# Patient Record
Sex: MALE | Race: WHITE
[De-identification: names, ages, dates, MRNs, and addresses within clinical notes are randomized per-mention and may not be internally consistent; named-entity substitution may affect disease eponyms.]

---

## 2021-08-27 NOTE — NUR
wheeled patient via gurney accompanied by EMT in no distress. RN asssigned at 
bedside to assume care.

## 2021-08-27 NOTE — NUR
MS RN CLOSING NOTES



PT IS IN BED, RESTING COMFORTABLY. PT IS AO x4. NO S/SX OF RESPIRATORY DISTRESS NOTED. NO 
SOB NOTED. PT IS ON ROOM AIR AND TOLERATING WELL. IV ACCESS IN LEFT AC #20 WITH D5NS RUNNING 
 ML/HR. NG TUBE IN PLACE IN LEFT NARE ON LOW INTERMITTENT SUCTION DRAINING BROWN AND 
BLOODY DRAINAGE. PATIENT'S PAIN TREATED. ALL NEEDS MET. PT KEPT CLEAN AND DRY. SAFETY 
MEASURES IN PLACE: BED IN LOWEST, LOCKED POSITION, BRAKES ON, SIDERAILS UPx2. WILL ENDORSE 
TO ONCOMING SHIFT.

## 2021-08-27 NOTE — NUR
PT BIBS WITH GENERALIZED STOMACH PAIN. NO N/V/D. PT ALERT AND ORIENTED X4. 
AMBULATORY WITH NON LABORED BREATHING.

## 2021-08-27 NOTE — NUR
RN NOTES



PATIENT ARRIVED TO UNIT AT ROOM 320-2 VIA GURNEY, ACCOMPANIED BY 1 ER TECH. PATIENT ABLE TO 
AMBULATE TO BED W/ STEADY GAIT.

## 2021-08-28 NOTE — NUR
MS/RN OPENING NOTE



RECEIVED PATIENT SLEEPING IN BED. ALERT AND ORIENTED X 4. ABLE TO MAKE NEEDS KNOWN. DENIES 
PAIN AT THIS TIME. CONTINUES ON ROOM AIR WITH NO S/SX OF RESPIRATORY DISTRESS NOTED. IV 
ACCESS TO RIGHT AC #20G INTACT AND PATENT. CONTINUES ON IVF D5 NS @ 100ML/HR. CONTINUES ON 
CLEAR LIQUID DIET WITH NO S/SX OF NAUSEA OR VOMITING. CALL LIGHT WITHIN REACH. ASPIRATION, 
FALL AND SAFETY PRECAUTIONS MAINTAINED. WILL CONTINUE TO MONITOR.

## 2021-08-28 NOTE — NUR
PAIN MANAGEMENT



PATIENT HAS C/O PAIN ON ABDOMINAL AREA WITH PAIN SCALE OF 9/10, DUE PRN PAIN MEDICATION 
GIVEN AS ORDERED.

## 2021-08-28 NOTE — NUR
RN NOTES



PATIENT WAS PICKED BY [facility] FROM RADIOLOGY DEPARTMENT FRO SMALL BOWEL THROUGH PROCEDURE. LEFT 
IN STABLE CONDITION.

## 2021-08-28 NOTE — NUR
MS RN CLOSING NOTES



PT IS IN BED, RESTING COMFORTABLY. PT IS AO x4. NO S/SX OF RESPIRATORY DISTRESS NOTED. NO 
SOB NOTED. PT IS ON ROOM AIR AND TOLERATING WELL. IV ACCESS IN LEFT AC #20 WITH D5NS RUNNING 
 ML/HR. NO C/O PAIN AT THIS TIME. SAFETY MEASURES IN PLACE: BED IN LOWEST, LOCKED 
POSITION, BRAKES ON, SIDE RAILS UPx2. ALL NEEDS ATTENDED AND MET. WILL ENDORSE TO ONCOMING 
SHIFT FOR JYOTI.

## 2021-08-28 NOTE — NUR
ALERT AND ORIENTED X4, STABLE ON ROOM AIR, ABDOMINAL DISCOMFORT, NG TUBE IN PLACE, CONFIRMED 
BY XRAY, ON LOW INTERMITTENT SUCTION, OUTPUT BROWN, MORPHINE 4 MG IV Q4HRS WITH ADEQUATE 
RELIEF. FOR GI CONSULT WITH DR. BUSBY, CONTINUE SUPPORITVE CARE AND PAIN MANAGEMENT.

## 2021-08-28 NOTE — NUR
MS RN OPENING NOTES



RECEIVED PT IS IN BED, RESTING COMFORTABLY. PT IS AO x4. NO S/SX OF RESPIRATORY DISTRESS 
NOTED. NO SOB NOTED. PT IS ON ROOM AIR AND TOLERATING WELL. IV ACCESS IN LEFT AC #20 WITH 
D5NS RUNNING  ML/HR. NG TUBE IN PLACE IN LEFT NARE ON LOW INTERMITTENT SUCTION 
DRAINING BROWN DRAINAGE. NO C/O PAIN AT THIS TIME. SAFETY MEASURES IN PLACE: BED IN LOWEST, 
LOCKED POSITION, BRAKES ON, SIDE RAILS UPx2. WILL CONTINUE TO MONITOR ACCORDINGLY.

## 2021-08-29 NOTE — NUR
MS RN OPENING NOTES



RECEIVED PT IS IN BED, RESTING COMFORTABLY. PT IS AO x4. NO S/SX OF RESPIRATORY DISTRESS 
NOTED. NO SOB NOTED. PT IS ON ROOM AIR AND TOLERATING WELL. IV ACCESS IN LEFT AC #20 WITH 
D5NS RUNNING  ML/HR. ABLE TO TOLERATE CLEAR LIQUIDS DIET, NO VOMITTING NO C/O OF 
NAUSEA AT THIS TIME. NO C/O PAIN AT THIS TIME. SAFETY MEASURES IN PLACE: BED IN LOWEST, 
LOCKED POSITION, BRAKES ON, SIDE RAILS UPx2. WILL CONTINUE TO MONITOR ACCORDINGLY.

## 2021-08-29 NOTE — NUR
MS RN DISCHARGE NOTES



DISCHARGE PATIENT IN STABLE CONDITION. VITAL SIGNS WITHIN NORMAL LIMITS. HEALTH EDUCATION 
AND INSTRUCTIONS PROVIDED TO PATIENT, INSTRUCTED ON FOLLOW UP WITH PCP FOR GI REFERRAL, 
PATIENT VERBALIZED UNDERSTANDING. IV ACCESS REMOVED, COVERED WITH GAUZE AND SECURED WITH 
TAPE. NO BLEEDING NOTED. ARM BAND REMOVED. BELONGINGS SIGNED ADN ACCOUNTED FOR. PATIENT LEFT 
UNIT WITH FAMILY. CHARGE NURSE AND MD INFORMED.

## 2021-10-01 ENCOUNTER — HOSPITAL ENCOUNTER (INPATIENT)
Dept: HOSPITAL 54 - ER | Age: 36
LOS: 1 days | Discharge: HOME | DRG: 390 | End: 2021-10-02
Attending: NURSE PRACTITIONER | Admitting: NURSE PRACTITIONER
Payer: COMMERCIAL

## 2021-10-01 VITALS — BODY MASS INDEX: 21.67 KG/M2 | HEIGHT: 72 IN | WEIGHT: 160 LBS

## 2021-10-01 VITALS — DIASTOLIC BLOOD PRESSURE: 73 MMHG | SYSTOLIC BLOOD PRESSURE: 128 MMHG

## 2021-10-01 DIAGNOSIS — Z20.822: ICD-10-CM

## 2021-10-01 DIAGNOSIS — K52.9: ICD-10-CM

## 2021-10-01 DIAGNOSIS — N40.0: ICD-10-CM

## 2021-10-01 DIAGNOSIS — K56.609: Primary | ICD-10-CM

## 2021-10-01 DIAGNOSIS — F90.9: ICD-10-CM

## 2021-10-01 DIAGNOSIS — D72.829: ICD-10-CM

## 2021-10-01 LAB
ALBUMIN SERPL BCP-MCNC: 3.4 G/DL (ref 3.4–5)
ALP SERPL-CCNC: 65 U/L (ref 46–116)
ALT SERPL W P-5'-P-CCNC: 28 U/L (ref 12–78)
AST SERPL W P-5'-P-CCNC: 14 U/L (ref 15–37)
BASOPHILS # BLD AUTO: 0 K/UL (ref 0–0.2)
BASOPHILS NFR BLD AUTO: 0.2 % (ref 0–2)
BILIRUB DIRECT SERPL-MCNC: 0.2 MG/DL (ref 0–0.2)
BILIRUB SERPL-MCNC: 0.6 MG/DL (ref 0.2–1)
BILIRUB UR QL STRIP: (no result)
BUN SERPL-MCNC: 13 MG/DL (ref 7–18)
CALCIUM SERPL-MCNC: 8.2 MG/DL (ref 8.5–10.1)
CHLORIDE SERPL-SCNC: 104 MMOL/L (ref 98–107)
CO2 SERPL-SCNC: 28 MMOL/L (ref 21–32)
COLOR UR: (no result)
CREAT SERPL-MCNC: 0.9 MG/DL (ref 0.6–1.3)
EOSINOPHIL NFR BLD AUTO: 0.2 % (ref 0–6)
GLUCOSE SERPL-MCNC: 143 MG/DL (ref 74–106)
GLUCOSE UR STRIP-MCNC: NEGATIVE MG/DL
HCT VFR BLD AUTO: 44 % (ref 39–51)
HGB BLD-MCNC: 14.8 G/DL (ref 13.5–17.5)
LEUKOCYTE ESTERASE UR QL STRIP: NEGATIVE
LIPASE SERPL-CCNC: 160 U/L (ref 73–393)
LYMPHOCYTES NFR BLD AUTO: 0.5 K/UL (ref 0.8–4.8)
LYMPHOCYTES NFR BLD AUTO: 4.6 % (ref 20–44)
MCHC RBC AUTO-ENTMCNC: 34 G/DL (ref 31–36)
MCV RBC AUTO: 86 FL (ref 80–96)
MONOCYTES NFR BLD AUTO: 0.7 K/UL (ref 0.1–1.3)
MONOCYTES NFR BLD AUTO: 6.2 % (ref 2–12)
NEUTROPHILS # BLD AUTO: 10.1 K/UL (ref 1.8–8.9)
NEUTROPHILS NFR BLD AUTO: 88.8 % (ref 43–81)
NITRITE UR QL STRIP: NEGATIVE
PH UR STRIP: 6 [PH] (ref 5–8)
PLATELET # BLD AUTO: 369 K/UL (ref 150–450)
POTASSIUM SERPL-SCNC: 3.9 MMOL/L (ref 3.5–5.1)
PROT SERPL-MCNC: 7.1 G/DL (ref 6.4–8.2)
PROT UR QL STRIP: (no result) MG/DL
RBC # BLD AUTO: 5.12 MIL/UL (ref 4.5–6)
RBC #/AREA URNS HPF: (no result) /HPF (ref 0–2)
SODIUM SERPL-SCNC: 139 MMOL/L (ref 136–145)
UROBILINOGEN UR STRIP-MCNC: 0.2 EU/DL
WBC #/AREA URNS HPF: (no result) /HPF (ref 0–3)
WBC NRBC COR # BLD AUTO: 11.4 K/UL (ref 4.3–11)

## 2021-10-01 PROCEDURE — G0378 HOSPITAL OBSERVATION PER HR: HCPCS

## 2021-10-01 PROCEDURE — C9113 INJ PANTOPRAZOLE SODIUM, VIA: HCPCS

## 2021-10-01 PROCEDURE — C9803 HOPD COVID-19 SPEC COLLECT: HCPCS

## 2021-10-01 RX ADMIN — ENOXAPARIN SODIUM SCH MG: 40 INJECTION SUBCUTANEOUS at 13:56

## 2021-10-01 RX ADMIN — SODIUM CHLORIDE SCH MG: 9 INJECTION, SOLUTION INTRAVENOUS at 13:55

## 2021-10-01 NOTE — NUR
TELE RN CLOSING NOTES



PATIENT REMAINS IN BED, AWAKE, A/OX4. PATIENT ON ROOM AIR; BREATHING EVEN AND UNLABORED AT 
THIS TIME; NO SOB NOTED DURING SHIFT. NO COMPLAINS OF AT THIS TIME. RFA G #18 PRESENT AND 
INTACT INFUSING NS @ 100 MLS/HR. ALL NEEDS ATTENDED DURING THE DAY. SAFETY PRECAUTIONS IN 
PLACE; BED IN LOW POSITION AND LOCKED, RAILS UP X2, CALL LIGHT WITHIN REACH. WILL ENDORSE TO 
NIGHT SHIFT NURSE.

## 2021-10-01 NOTE — NUR
PT AAOX4. BIB BROTHER C/O GENERALIZED ABD PAIN X 6 HOURS. +N/V. LBM: 7 HOURS 
AGO. PLACED IN BED 2 ON MONITOR AND PULSE OX. AWAITING ORDERS.

## 2021-10-01 NOTE — NUR
MS RN OPENING NOTES: RECEIVED PATIENT IN BED, AWAKE, A/O X4. NO S/S OF DISTRESS NOTED. CALL 
LIGHT WITHIN REACH. BED IN LOWEST AND LOCKED POSITION. NPO, PATIENT IS AWARE.

## 2021-10-01 NOTE — NUR
MS RN NOTES



RECEIVED ADMISSION FROM ER. PATIENT MEDICALLY STABLE AT THIS TIME. VS WNL. PATIENT A/O X4, 
ABLE TO MAKE NEEDS KNOWN, ON ROOM AIR; BREATHING EVEN AND UNLABORED AT THIS TIME. RAC IV 
ACCESS G # 18 PRESENT AND INTACT INFUSING D5 1/2 NS @ 100 MLS/HR. SAFETY PRECAUTIONS IN 
PLACE; BED IN LOW POSITION AND LOCKED, RAILS UP X2, CALL LIGHT WITHIN REACH. WILL CONTINUE 
TO MONITOR PATIENT.

## 2021-10-02 VITALS — DIASTOLIC BLOOD PRESSURE: 65 MMHG | SYSTOLIC BLOOD PRESSURE: 105 MMHG

## 2021-10-02 LAB
BASOPHILS # BLD AUTO: 0 K/UL (ref 0–0.2)
BASOPHILS NFR BLD AUTO: 0.8 % (ref 0–2)
BUN SERPL-MCNC: 11 MG/DL (ref 7–18)
CALCIUM SERPL-MCNC: 7.8 MG/DL (ref 8.5–10.1)
CHLORIDE SERPL-SCNC: 108 MMOL/L (ref 98–107)
CHOLEST SERPL-MCNC: 97 MG/DL (ref ?–200)
CO2 SERPL-SCNC: 27 MMOL/L (ref 21–32)
CREAT SERPL-MCNC: 0.8 MG/DL (ref 0.6–1.3)
EOSINOPHIL NFR BLD AUTO: 1 % (ref 0–6)
GLUCOSE SERPL-MCNC: 91 MG/DL (ref 74–106)
HCT VFR BLD AUTO: 37 % (ref 39–51)
HDLC SERPL-MCNC: 37 MG/DL (ref 40–60)
HGB BLD-MCNC: 12.5 G/DL (ref 13.5–17.5)
LDLC SERPL DIRECT ASSAY-MCNC: 50 MG/DL (ref 0–99)
LYMPHOCYTES NFR BLD AUTO: 1.1 K/UL (ref 0.8–4.8)
LYMPHOCYTES NFR BLD AUTO: 28.9 % (ref 20–44)
MAGNESIUM SERPL-MCNC: 2.2 MG/DL (ref 1.8–2.4)
MCHC RBC AUTO-ENTMCNC: 34 G/DL (ref 31–36)
MCV RBC AUTO: 86 FL (ref 80–96)
MONOCYTES NFR BLD AUTO: 0.6 K/UL (ref 0.1–1.3)
MONOCYTES NFR BLD AUTO: 15.6 % (ref 2–12)
NEUTROPHILS # BLD AUTO: 2 K/UL (ref 1.8–8.9)
NEUTROPHILS NFR BLD AUTO: 53.7 % (ref 43–81)
PHOSPHATE SERPL-MCNC: 3.8 MG/DL (ref 2.5–4.9)
PLATELET # BLD AUTO: 334 K/UL (ref 150–450)
POTASSIUM SERPL-SCNC: 4.1 MMOL/L (ref 3.5–5.1)
RBC # BLD AUTO: 4.27 MIL/UL (ref 4.5–6)
SODIUM SERPL-SCNC: 141 MMOL/L (ref 136–145)
TRIGL SERPL-MCNC: 71 MG/DL (ref 30–150)
WBC NRBC COR # BLD AUTO: 3.6 K/UL (ref 4.3–11)

## 2021-10-02 RX ADMIN — ENOXAPARIN SODIUM SCH MG: 40 INJECTION SUBCUTANEOUS at 08:51

## 2021-10-02 RX ADMIN — SODIUM CHLORIDE SCH MG: 9 INJECTION, SOLUTION INTRAVENOUS at 08:51

## 2021-10-02 NOTE — NUR
MS RN DISCHARGE NOTES

PATIENT IS FOR DISCHARGE PER DOCTOR'S ORDER. FOR DISCHARGE TO HOME. PRESCRIPTION  GIVEN TO 
PATIENT AND DISCHARGE INSTRUCTION PROVIDED. PATIENT VERBALIZED UNDERSTANDING. ALL BELONGINGS 
CHECKED AND ACCOUNTED FOR. INSTRUCTED PATIENT TO FOLLOW UP WITH PCP 1 WEEK AFTER DISCHARGE. 
ASSISTED PATIENT TO THE LOBBY AND LEFT VIA PRIVATE CAR IN STABLE CONDITION.

## 2021-10-02 NOTE — NUR
MS RN OPENING NOTES: PATIENT IN BED, AWAKE, A/O X4. NO S/S OF DISTRESS NOTED. CALL LIGHT 
WITHIN REACH. BED IN LOWEST AND LOCKED POSITION. HAD AN EPISODE OF VOMITTING X1 LAST NIGHT, 
ZOFRAN 4MG IV GIVEN. PATIENT RESTED THROUGHOUT THE NIGHT. AMBULATORY.

-------------------------------------------------------------------------------

Addendum: 10/02/21 at 0614 by BROOKLYN PINEDA RN

-------------------------------------------------------------------------------

MS RN CLOSING NOTES:

## 2021-10-02 NOTE — NUR
MS RN OPENING NOTES

RECEIVED PATIENT ON BED, AWAKE AND A/O X4. ON ROOM AIR TOLERATING WELL. NO SOB NOTED. NOT IN 
DISTRESS. WITH NO COMPLAINTS OF PAIN OR DISCOMFORT AT THIS TIME. WITH IV ACCESS AT RIGHT AC 
G18, SALINE LOCKED, INTACT AND PATENT. SAFETY MEASURES IN PLACED. CALL LIGHT WITHIN REACH. 
BED ON LOWEST AND LOCKED POSITION, SIDE RAILS UP X2. WILL CONTINUE TO MONITOR.